# Patient Record
Sex: MALE | Race: WHITE
[De-identification: names, ages, dates, MRNs, and addresses within clinical notes are randomized per-mention and may not be internally consistent; named-entity substitution may affect disease eponyms.]

---

## 2023-07-08 ENCOUNTER — HOSPITAL ENCOUNTER (OUTPATIENT)
Dept: HOSPITAL 101 - LAB | Age: 63
Discharge: HOME | End: 2023-07-08
Payer: MEDICARE

## 2023-07-08 DIAGNOSIS — R19.7: Primary | ICD-10-CM

## 2023-09-06 PROBLEM — Z98.61 S/P PTCA (PERCUTANEOUS TRANSLUMINAL CORONARY ANGIOPLASTY): Status: ACTIVE | Noted: 2023-09-06

## 2023-09-06 PROBLEM — I25.10 2-VESSEL CORONARY ARTERY DISEASE: Status: ACTIVE | Noted: 2023-09-06

## 2023-09-06 PROBLEM — I25.2 HISTORY OF ANTERIOR WALL MYOCARDIAL INFARCTION: Status: ACTIVE | Noted: 2023-09-06

## 2023-09-06 PROBLEM — I50.22 CHRONIC SYSTOLIC CONGESTIVE HEART FAILURE (MULTI): Status: ACTIVE | Noted: 2023-09-06

## 2023-09-06 PROBLEM — I25.5 ISCHEMIC CARDIOMYOPATHY: Status: ACTIVE | Noted: 2023-09-06

## 2023-09-06 PROBLEM — I47.20 VENTRICULAR TACHYCARDIA (PAROXYSMAL): Status: ACTIVE | Noted: 2023-09-06

## 2023-09-06 PROBLEM — I10 HYPERTENSION: Status: ACTIVE | Noted: 2023-09-06

## 2023-09-06 PROBLEM — F17.200 CURRENT EVERY DAY SMOKER: Status: ACTIVE | Noted: 2023-09-06

## 2023-09-06 PROBLEM — R06.00 DYSPNEA: Status: ACTIVE | Noted: 2023-09-06

## 2023-09-06 PROBLEM — Z95.810 CARDIAC DEFIBRILLATOR IN PLACE: Status: ACTIVE | Noted: 2023-09-06

## 2023-09-06 PROBLEM — K02.9 DENTAL DECAY: Status: ACTIVE | Noted: 2023-09-06

## 2023-09-06 PROBLEM — I47.29 VENTRICULAR TACHYCARDIA (PAROXYSMAL) (MULTI): Status: ACTIVE | Noted: 2023-09-06

## 2023-09-06 RX ORDER — TRAZODONE HYDROCHLORIDE 100 MG/1
1 TABLET ORAL NIGHTLY
COMMUNITY

## 2023-09-06 RX ORDER — NITROGLYCERIN 0.4 MG/1
TABLET SUBLINGUAL SEE ADMIN INSTRUCTIONS
COMMUNITY

## 2023-09-06 RX ORDER — OMEPRAZOLE 20 MG/1
1 TABLET, DELAYED RELEASE ORAL DAILY
COMMUNITY

## 2023-09-06 RX ORDER — ASPIRIN 81 MG/1
1 TABLET ORAL DAILY
COMMUNITY

## 2023-09-06 RX ORDER — BUSPIRONE HYDROCHLORIDE 30 MG/1
1 TABLET ORAL 2 TIMES DAILY
COMMUNITY

## 2023-09-06 RX ORDER — GABAPENTIN 300 MG/1
1 CAPSULE ORAL 3 TIMES DAILY
COMMUNITY

## 2023-09-06 RX ORDER — SACUBITRIL AND VALSARTAN 24; 26 MG/1; MG/1
1 TABLET, FILM COATED ORAL 2 TIMES DAILY
COMMUNITY
Start: 2022-07-07

## 2023-09-06 RX ORDER — CARVEDILOL 6.25 MG/1
1 TABLET ORAL
COMMUNITY
End: 2024-04-29

## 2023-09-06 RX ORDER — VENLAFAXINE HYDROCHLORIDE 75 MG/1
1 CAPSULE, EXTENDED RELEASE ORAL DAILY
COMMUNITY

## 2023-09-06 RX ORDER — VENLAFAXINE HYDROCHLORIDE 150 MG/1
1 CAPSULE, EXTENDED RELEASE ORAL DAILY
COMMUNITY

## 2023-09-06 RX ORDER — ALBUTEROL SULFATE 90 UG/1
1 AEROSOL, METERED RESPIRATORY (INHALATION) EVERY 4 HOURS PRN
COMMUNITY

## 2023-09-06 RX ORDER — QUETIAPINE FUMARATE 50 MG/1
1 TABLET, FILM COATED ORAL NIGHTLY
COMMUNITY

## 2023-10-11 ENCOUNTER — OFFICE VISIT (OUTPATIENT)
Dept: CARDIOLOGY | Facility: CLINIC | Age: 63
End: 2023-10-11
Payer: MEDICARE

## 2023-10-11 ENCOUNTER — HOSPITAL ENCOUNTER (OUTPATIENT)
Dept: CARDIOLOGY | Facility: HOSPITAL | Age: 63
Discharge: HOME | End: 2023-10-11
Payer: MEDICARE

## 2023-10-11 VITALS
WEIGHT: 146 LBS | DIASTOLIC BLOOD PRESSURE: 78 MMHG | BODY MASS INDEX: 20.9 KG/M2 | HEIGHT: 70 IN | SYSTOLIC BLOOD PRESSURE: 118 MMHG

## 2023-10-11 DIAGNOSIS — Z95.810 CARDIAC DEFIBRILLATOR IN PLACE: ICD-10-CM

## 2023-10-11 DIAGNOSIS — F17.200 CURRENT EVERY DAY SMOKER: ICD-10-CM

## 2023-10-11 DIAGNOSIS — I25.5 ISCHEMIC CARDIOMYOPATHY: ICD-10-CM

## 2023-10-11 DIAGNOSIS — Z95.810 ICD (IMPLANTABLE CARDIOVERTER-DEFIBRILLATOR) IN PLACE: ICD-10-CM

## 2023-10-11 DIAGNOSIS — I47.29 VENTRICULAR TACHYCARDIA (PAROXYSMAL) (MULTI): Primary | ICD-10-CM

## 2023-10-11 DIAGNOSIS — I50.22 CHRONIC SYSTOLIC CONGESTIVE HEART FAILURE (MULTI): ICD-10-CM

## 2023-10-11 DIAGNOSIS — I42.9 PRIMARY CARDIOMYOPATHY (MULTI): ICD-10-CM

## 2023-10-11 PROCEDURE — 93290 INTERROG DEV EVAL ICPMS IP: CPT | Performed by: INTERNAL MEDICINE

## 2023-10-11 PROCEDURE — 3074F SYST BP LT 130 MM HG: CPT | Performed by: INTERNAL MEDICINE

## 2023-10-11 PROCEDURE — 3078F DIAST BP <80 MM HG: CPT | Performed by: INTERNAL MEDICINE

## 2023-10-11 PROCEDURE — 3008F BODY MASS INDEX DOCD: CPT | Performed by: INTERNAL MEDICINE

## 2023-10-11 PROCEDURE — 93000 ELECTROCARDIOGRAM COMPLETE: CPT | Performed by: INTERNAL MEDICINE

## 2023-10-11 PROCEDURE — 93283 PRGRMG EVAL IMPLANTABLE DFB: CPT | Performed by: INTERNAL MEDICINE

## 2023-10-11 PROCEDURE — 93290 INTERROG DEV EVAL ICPMS IP: CPT

## 2023-10-11 PROCEDURE — 99214 OFFICE O/P EST MOD 30 MIN: CPT | Performed by: INTERNAL MEDICINE

## 2023-10-11 RX ORDER — AMOXICILLIN 500 MG/1
500 CAPSULE ORAL
COMMUNITY
Start: 2023-09-29 | End: 2024-05-13 | Stop reason: WASHOUT

## 2023-10-11 RX ORDER — ATORVASTATIN CALCIUM 20 MG/1
20 TABLET, FILM COATED ORAL NIGHTLY
COMMUNITY

## 2023-10-11 RX ORDER — MEMANTINE HYDROCHLORIDE 28 MG/1
28 CAPSULE, EXTENDED RELEASE ORAL DAILY
COMMUNITY
Start: 2023-09-26

## 2023-10-11 NOTE — PROGRESS NOTES
CARDIOLOGY OFFICE VISIT      CHIEF COMPLAINT  Chief Complaint   Patient presents with    Follow-up     Patient presented today for a 6 month follow up.         HISTORY OF PRESENT ILLNESS  HPI      63-year-old  male who is followed for ischemic cardiomyopathy with a left ventricular ejection fraction of 34% per cardiac MRI dated August 30, 2021 with Lexiscan stress test dated June 30, 2022 revealing an ejection fraction of 26% with severe large anterior septal, apical, and lateral MI. Patient underwent PTCA with drug-eluting stent to the mid LAD on May 20, 2018. 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 He underwent implantation of a dual-chamber ICD on September 10, 2021 for primary prevention of sudden cardiac death and presents to the office today for follow-up evaluation.    From the electrophysiology standpoint he is doing well.  Patient states that he denies any symptoms of chest pain or shortness of breath or palpitations.    Patient had an ICD interrogation today at the device clinic and it shows adequate sensing, capture and impedances of all leads.  No evidence of atrial fibrillation.  Battery longevity 10 years.    EKG performed today shows sinus rhythm at rate of 67 bpm anteroseptal infarct.  QRS duration 96 ms.  QT corrected 456 ms.  Rhythm strip shows the same pattern.        Past Medical History  Past Medical History:   Diagnosis Date    Personal history of other specified conditions 11/28/2021    History of weight loss       Social History  Social History     Tobacco Use    Smoking status: Not on file    Smokeless tobacco: Not on file   Substance Use Topics    Alcohol use: Not on file    Drug use: Not on file       Family History     Family History   Problem Relation Name Age of Onset    Other (Cardiac pacemaker) Mother          Allergies:  No Known Allergies     Outpatient Medications:  Current Outpatient Medications   Medication  Instructions    albuterol 90 mcg/actuation inhaler 1 puff, inhalation, Every 4 hours PRN    amoxicillin (AMOXIL) 500 mg, oral, 3 times daily RT    aspirin 81 mg EC tablet 1 tablet, oral, Daily    atorvastatin (LIPITOR) 20 mg, oral, Nightly    busPIRone (Buspar) 30 mg tablet 1 tablet, oral, 2 times daily    carvedilol (Coreg) 6.25 mg tablet 1 tablet, oral, 2 times daily with meals    gabapentin (Neurontin) 300 mg capsule 1 capsule, oral, 3 times daily    L. acidophilus/Bifid. animalis 32 billion cell capsule oral, Use as directed    memantine (NAMENDA) 28 mg, oral, Daily    nitroglycerin (Nitrostat) 0.4 mg SL tablet sublingual, See admin instructions, PLACE 1 TABLET UNDER THE TONGUE EVERY 5 MINUTES FOR UP TO 3 DOSES AS NEEDED FOR CHEST PAIN.CALL 911 IF PAIN PERSISTS.    omeprazole OTC (PriLOSEC OTC) 20 mg EC tablet 1 tablet, oral, Daily    QUEtiapine (SEROquel) 50 mg tablet 1 tablet, oral, Nightly    sacubitriL-valsartan (Entresto) 24-26 mg tablet 1 tablet, oral, 2 times daily    traZODone (Desyrel) 100 mg tablet 1 tablet, oral, Nightly    venlafaxine XR (Effexor-XR) 150 mg 24 hr capsule 1 capsule, oral, Daily, Take with food    venlafaxine XR (Effexor-XR) 75 mg 24 hr capsule 1 capsule, oral, Daily, Take with food          REVIEW OF SYSTEMS  ROS      VITALS  Vitals:    10/11/23 1447   BP: 118/78       PHYSICAL EXAM  Physical Exam      ASSESSMENT AND PLAN    Clinical impressions:  1. Ischemic cardiomyopathy with a left ventricular ejection fraction of 34% per cardiac MRI dated August 30, 2021, New York Heart Association class II, stage B heart failure.  2. Coronary artery disease status post angioplasty with stent deployment to the mid LAD on May 28, 2018 with cardiac MRI dated August 30, 2021 revealing a large transmural anterior, apical, and septal MI.  3. 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  4. Ascending thoracic aneurysm at 4.1 cm per cardiac MRI dated August 30, 2021.  5. Hypertension,  controlled   6. Tobacco dependency.  7. Nonsustained ventricular tachycardia controlled on beta-blockade.  8. COPD on albuterol as needed.  9. Gastroesophageal reflux disease.  10. Anxiety disorder.  11.  Status post dual-chamber ICD implanted in September 2021 for primary prevention of sudden cardiac death      Plan-recommendations    From the electrophysiology standpoint he is doing well.  Patient will continue with current medical therapy.    Continue with beta-blocker therapy.    We will continue watching the burden of atrial and ventricular arrhythmias by device interrogation.    Followed by device clinic as scheduled.    Follow my office every 6 months or sooner needed.    Risk factor modification and lifestyle modification discussed with patient. Diet , exercise and hydration discussed with patient.    I have personally review with patient during this office visit, laboratory data, echocardiogram results, stress test results, Holter-event monitor results prior and after the last electrophysiology visit. All questions has been answered.    Please excuse any errors in grammar or translation related to this dictation.  Voice recognition software was utilized to prepare this document.

## 2023-11-03 ENCOUNTER — TELEPHONE (OUTPATIENT)
Dept: CARDIOLOGY | Facility: CLINIC | Age: 63
End: 2023-11-03
Payer: MEDICARE

## 2023-11-03 NOTE — TELEPHONE ENCOUNTER
Patient's wife called office stating he just received a new remote monitor 2 weeks ago from [employer].  It was working fine and now the light is blinking orange.  She was wondering if we knew why.  I advised her to contact [employer].  She believes that her  most likely threw out the book for the monitor.  She will contact [employer].

## 2023-11-10 ENCOUNTER — HOSPITAL ENCOUNTER (OUTPATIENT)
Dept: CARDIOLOGY | Facility: HOSPITAL | Age: 63
Discharge: HOME | End: 2023-11-10
Payer: MEDICARE

## 2023-11-10 DIAGNOSIS — Z95.810 CARDIAC DEFIBRILLATOR IN PLACE: ICD-10-CM

## 2024-01-17 ENCOUNTER — HOSPITAL ENCOUNTER (OUTPATIENT)
Dept: CARDIOLOGY | Facility: HOSPITAL | Age: 64
Discharge: HOME | End: 2024-01-17
Payer: MEDICARE

## 2024-01-17 ENCOUNTER — HOSPITAL ENCOUNTER
Dept: HOSPITAL 101 - LAB | Age: 64
Discharge: HOME | End: 2024-01-17
Payer: MEDICARE

## 2024-01-17 DIAGNOSIS — Z95.810 CARDIAC DEFIBRILLATOR IN PLACE: ICD-10-CM

## 2024-01-17 DIAGNOSIS — I50.42: ICD-10-CM

## 2024-01-17 DIAGNOSIS — R23.8: Primary | ICD-10-CM

## 2024-01-17 LAB
ADD MANUAL DIFF: NO
HCT VFR BLD CALC: 35.6 % (ref 42–54)
HEMATOCRIT: 35.6 % (ref 42–54)
HEMOGLOBIN: 11.9 G/DL (ref 14–18)
IMMATURE GRANULOCYTES ABS AUTO: 0.01 10^3/UL (ref 0–0.03)
IMMATURE GRANULOCYTES PCT AUTO: 0.2 % (ref 0–0.5)
INR: <0.93
LYMPHOCYTES  ABSOLUTE AUTO: 1.5 10^3/UL (ref 1.2–3.8)
MCV RBC: 100 FL (ref 80–94)
MEAN CORPUSCULAR HEMOGLOBIN: 33.4 PG (ref 25.9–34)
MEAN CORPUSCULAR HGB CONC: 33.4 G/DL (ref 29.9–35.2)
MEAN CORPUSCULAR VOLUME: 100 FL (ref 80–94)
PARTIAL THROMBOPLASTIN TIME: 25.8 SEC (ref 22.3–36.2)
PLATELET # BLD: 225 10^3/UL (ref 150–450)
PLATELET COUNT: 225 10^3/UL (ref 150–450)
PROTHROMBIN TIME: 9.8 SEC (ref 9–11.6)
RED BLOOD COUNT: 3.56 10^6/UL (ref 4.7–6.1)
WBC # BLD: 5.3 10^3/UL (ref 4–11)
WHITE BLOOD COUNT: 5.3 10^3/UL (ref 4–11)

## 2024-01-17 PROCEDURE — 36415 COLL VENOUS BLD VENIPUNCTURE: CPT

## 2024-01-17 PROCEDURE — 85610 PROTHROMBIN TIME: CPT

## 2024-01-17 PROCEDURE — 93296 REM INTERROG EVL PM/IDS: CPT

## 2024-01-17 PROCEDURE — 85730 THROMBOPLASTIN TIME PARTIAL: CPT

## 2024-01-17 PROCEDURE — 93295 DEV INTERROG REMOTE 1/2/MLT: CPT | Performed by: INTERNAL MEDICINE

## 2024-01-17 PROCEDURE — 85025 COMPLETE CBC W/AUTO DIFF WBC: CPT

## 2024-03-20 ENCOUNTER — HOSPITAL ENCOUNTER (OUTPATIENT)
Dept: CARDIOLOGY | Facility: HOSPITAL | Age: 64
Discharge: HOME | End: 2024-03-20
Payer: MEDICARE

## 2024-03-20 DIAGNOSIS — Z95.810 CARDIAC DEFIBRILLATOR IN PLACE: ICD-10-CM

## 2024-04-16 ENCOUNTER — HOSPITAL ENCOUNTER (OUTPATIENT)
Dept: CARDIOLOGY | Facility: HOSPITAL | Age: 64
Discharge: HOME | End: 2024-04-16
Payer: MEDICARE

## 2024-04-16 DIAGNOSIS — Z95.810 PRESENCE OF AUTOMATIC CARDIOVERTER/DEFIBRILLATOR (AICD): ICD-10-CM

## 2024-04-16 DIAGNOSIS — Z95.810 PRESENCE OF AUTOMATIC CARDIOVERTER/DEFIBRILLATOR (AICD): Primary | ICD-10-CM

## 2024-04-16 DIAGNOSIS — I42.9 CARDIOMYOPATHY, UNSPECIFIED TYPE (MULTI): ICD-10-CM

## 2024-04-16 PROCEDURE — 93295 DEV INTERROG REMOTE 1/2/MLT: CPT | Performed by: INTERNAL MEDICINE

## 2024-04-16 PROCEDURE — 93296 REM INTERROG EVL PM/IDS: CPT

## 2024-04-17 ENCOUNTER — APPOINTMENT (OUTPATIENT)
Dept: CARDIOLOGY | Facility: HOSPITAL | Age: 64
End: 2024-04-17
Payer: MEDICARE

## 2024-04-17 ENCOUNTER — APPOINTMENT (OUTPATIENT)
Dept: CARDIOLOGY | Facility: CLINIC | Age: 64
End: 2024-04-17
Payer: MEDICARE

## 2024-04-29 DIAGNOSIS — I10 HYPERTENSION, UNSPECIFIED TYPE: ICD-10-CM

## 2024-04-29 RX ORDER — CARVEDILOL 6.25 MG/1
6.25 TABLET ORAL
Qty: 180 TABLET | Refills: 3 | Status: SHIPPED | OUTPATIENT
Start: 2024-04-29 | End: 2024-05-13

## 2024-05-13 ENCOUNTER — HOSPITAL ENCOUNTER (OUTPATIENT)
Dept: CARDIOLOGY | Facility: HOSPITAL | Age: 64
Discharge: HOME | End: 2024-05-13
Payer: MEDICARE

## 2024-05-13 ENCOUNTER — OFFICE VISIT (OUTPATIENT)
Dept: CARDIOLOGY | Facility: CLINIC | Age: 64
End: 2024-05-13
Payer: MEDICARE

## 2024-05-13 VITALS
HEART RATE: 62 BPM | DIASTOLIC BLOOD PRESSURE: 60 MMHG | SYSTOLIC BLOOD PRESSURE: 87 MMHG | HEIGHT: 70 IN | WEIGHT: 151 LBS | BODY MASS INDEX: 21.62 KG/M2

## 2024-05-13 DIAGNOSIS — Z95.810 CARDIAC DEFIBRILLATOR IN PLACE: Primary | ICD-10-CM

## 2024-05-13 DIAGNOSIS — I47.29 VENTRICULAR TACHYCARDIA (PAROXYSMAL) (MULTI): ICD-10-CM

## 2024-05-13 DIAGNOSIS — I10 PRIMARY HYPERTENSION: ICD-10-CM

## 2024-05-13 DIAGNOSIS — I10 HYPERTENSION, UNSPECIFIED TYPE: ICD-10-CM

## 2024-05-13 DIAGNOSIS — Z95.810 PRESENCE OF AUTOMATIC CARDIOVERTER/DEFIBRILLATOR (AICD): ICD-10-CM

## 2024-05-13 DIAGNOSIS — I25.10 2-VESSEL CORONARY ARTERY DISEASE: ICD-10-CM

## 2024-05-13 DIAGNOSIS — F17.200 CURRENT EVERY DAY SMOKER: ICD-10-CM

## 2024-05-13 DIAGNOSIS — I42.9 CARDIOMYOPATHY, UNSPECIFIED TYPE (MULTI): ICD-10-CM

## 2024-05-13 DIAGNOSIS — I50.22 CHRONIC SYSTOLIC CONGESTIVE HEART FAILURE (MULTI): ICD-10-CM

## 2024-05-13 DIAGNOSIS — I25.5 ISCHEMIC CARDIOMYOPATHY: ICD-10-CM

## 2024-05-13 DIAGNOSIS — Z98.61 S/P PTCA (PERCUTANEOUS TRANSLUMINAL CORONARY ANGIOPLASTY): ICD-10-CM

## 2024-05-13 DIAGNOSIS — R06.09 DYSPNEA ON EXERTION: ICD-10-CM

## 2024-05-13 PROCEDURE — 3008F BODY MASS INDEX DOCD: CPT | Performed by: NURSE PRACTITIONER

## 2024-05-13 PROCEDURE — 93283 PRGRMG EVAL IMPLANTABLE DFB: CPT | Performed by: INTERNAL MEDICINE

## 2024-05-13 PROCEDURE — 3074F SYST BP LT 130 MM HG: CPT | Performed by: NURSE PRACTITIONER

## 2024-05-13 PROCEDURE — 3078F DIAST BP <80 MM HG: CPT | Performed by: NURSE PRACTITIONER

## 2024-05-13 PROCEDURE — 93283 PRGRMG EVAL IMPLANTABLE DFB: CPT

## 2024-05-13 PROCEDURE — 99214 OFFICE O/P EST MOD 30 MIN: CPT | Performed by: NURSE PRACTITIONER

## 2024-05-13 RX ORDER — CARVEDILOL 6.25 MG/1
3.12 TABLET ORAL
Qty: 180 TABLET | Refills: 3 | Status: SHIPPED | OUTPATIENT
Start: 2024-05-13

## 2024-05-13 NOTE — PROGRESS NOTES
"CARDIOLOGY OFFICE VISIT      CHIEF COMPLAINT  Chief Complaint   Patient presents with    Follow-up     Pt is here today following up after 6 months w/ PMC   Chief complaint: \"My blood pressures have been low.\"    HISTORY OF PRESENT ILLNESS  HPI  History: The patient is a 64-year-old  male who is followed for ischemic cardiomyopathy with a left ventricular ejection fraction of 34% per cardiac MRI dated August 30, 2021 and Lexiscan stress test dated June 30, 2022 revealing an ejection fraction of 26% with a severe large anterior septal, apical, and lateral MI.  He underwent a PTCA with drug-eluting stent to the mid LAD on May 20, 2018.  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  He underwent implantation of a dual-chamber ICD on September 10, 2021 for primary prevention of sudden cardiac death and presents the office today for follow-up evaluation.  He states that his blood pressure has been running low and he is experiencing some transient dizziness and lightheadedness.  He denies near or lesly syncope, chest pain, palpitations, shortness of breath, abdominal distention, or lower extremity edema.  He is accompanied by his wife for today's office visit.  Past Medical History  Past Medical History:   Diagnosis Date    Personal history of other specified conditions 11/28/2021    History of weight loss       Social History  Social History     Tobacco Use    Smoking status: Not on file    Smokeless tobacco: Not on file   Substance Use Topics    Alcohol use: Not on file    Drug use: Not on file       Family History     Family History   Problem Relation Name Age of Onset    Other (Cardiac pacemaker) Mother          Allergies:  No Known Allergies     Outpatient Medications:  Current Outpatient Medications   Medication Instructions    albuterol 90 mcg/actuation inhaler 1 puff, inhalation, Every 4 hours PRN    amoxicillin (AMOXIL) 500 mg, oral, 3 times " daily RT    aspirin 81 mg EC tablet 1 tablet, oral, Daily    atorvastatin (LIPITOR) 20 mg, oral, Nightly    busPIRone (Buspar) 30 mg tablet 1 tablet, oral, 2 times daily    carvedilol (COREG) 6.25 mg, oral, 2 times daily with meals    gabapentin (Neurontin) 300 mg capsule 1 capsule, oral, 3 times daily    L. acidophilus/Bifid. animalis 32 billion cell capsule oral, Use as directed    memantine (NAMENDA) 28 mg, oral, Daily    nitroglycerin (Nitrostat) 0.4 mg SL tablet sublingual, See admin instructions, PLACE 1 TABLET UNDER THE TONGUE EVERY 5 MINUTES FOR UP TO 3 DOSES AS NEEDED FOR CHEST PAIN.CALL 911 IF PAIN PERSISTS.    omeprazole OTC (PriLOSEC OTC) 20 mg EC tablet 1 tablet, oral, Daily    QUEtiapine (SEROquel) 50 mg tablet 1 tablet, oral, Nightly    sacubitriL-valsartan (Entresto) 24-26 mg tablet 1 tablet, oral, 2 times daily    traZODone (Desyrel) 100 mg tablet 1 tablet, oral, Nightly    venlafaxine XR (Effexor-XR) 150 mg 24 hr capsule 1 capsule, oral, Daily, Take with food    venlafaxine XR (Effexor-XR) 75 mg 24 hr capsule 1 capsule, oral, Daily, Take with food          REVIEW OF SYSTEMS  Review of Systems   All other systems reviewed and are negative.        VITALS  There were no vitals filed for this visit.    PHYSICAL EXAM  Vitals and nursing note reviewed.   Constitutional:       Appearance: Normal appearance.   HENT:      Head: Normocephalic.   Neck:      Vascular: No JVD.   Cardiovascular:      Rate and Rhythm: Normal rate and regular rhythm.      Pulses: Normal pulses.      Heart sounds: Normal heart sounds.   Pulmonary:      Effort: Pulmonary effort is normal.      Breath sounds: Normal breath sounds.   Abdominal:      General: Bowel sounds are normal.      Palpations: Abdomen is soft.   Musculoskeletal:         General: Normal range of motion.      Cervical back: Normal range of motion.   Skin:     General: Skin is warm and dry.  Left subclavian ICD pocket is well-healed without redness swelling or  drainage.  Neurological:      General: No focal deficit present.      Mental Status: She is alert and oriented to person, place, and time.      Motor: Motor function is intact.   Psychiatric:         Attention and Perception: Attention and perception normal.         Mood and Affect: Mood and affect normal.         Speech: Speech normal.         Behavior: Behavior normal. Behavior is cooperative.         Thought Content: Thought content normal.         Cognition and Memory: Cognition and memory normal.        Labs and testing: Twelve-lead EKG reveals atrial pacing and ventricular tracking at 62 bpm, prolonged AV conduction with a CO interval of 224 ms, QRS durations 84 ms,  ms, QTc 416 ms.  No acute ischemic changes are noted.  ICD interrogation dated May 13, 2024 reveals atrial pacing 20.77% and ventricular pacing 0.05%.  No atrial or ventricular arrhythmic events are noted.  Good sensing and capture thresholds are noted.  Estimated battery longevity is 10 years and 1 month.    ASSESSMENT AND PLAN    Clinical impressions:  1. Ischemic cardiomyopathy with a left ventricular ejection fraction of 34% per cardiac MRI dated August 30, 2021, New York Heart Association class II, stage B heart failure.  2. Coronary artery disease status post angioplasty with stent deployment to the mid LAD on May 28, 2018 with cardiac MRI dated August 30, 2021 revealing a large transmural anterior, apical, and septal MI.  3. 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  4. Ascending thoracic aneurysm at 4.1 cm per cardiac MRI dated August 30, 2021.  5. Hypertension, controlled with a blood pressure today of 87/60.  6. Tobacco dependency.  7. Nonsustained ventricular tachycardia controlled on beta-blockade.  8. COPD on albuterol as needed.  9. Gastroesophageal reflux disease.  10. Anxiety disorder.  11.  Dual-chamber ICD implant ([employer] cobalt XT DR MRI) on September 10, 2021 for primary prevention of sudden cardiac  death.    Recommendations:  1.  Continue current medications as prescribed.  The patient will reduce the dosage of carvedilol from 6.25 mg twice a day to 3.125 mg twice a day due to low blood pressure readings with complaints of dizziness and lightheadedness.  2.  Obtain a remote device interrogation on August 15, 2024 and in clinic check in 6 months prior to the office visit with Dr. Feldman.  3.  Follow-up in office with Dr. Feldman in 6 months or sooner if needed.  4.  Continue lifestyle modifications as discussed.  Patient was instructed to refrain from consuming Mountain Dew.  He was encouraged to increase water consumption to 64 ounces per day and refrain from caffeine and alcohol consumption as well.    Evaluation and note by Melissa Mcgee CNP  **Please excuse any errors in grammar or translation related to this dictation.  Voice recognition software was utilized to prepare this document.**

## 2024-08-14 ENCOUNTER — TELEPHONE (OUTPATIENT)
Dept: CARDIOLOGY | Facility: CLINIC | Age: 64
End: 2024-08-14
Payer: MEDICARE

## 2024-08-14 NOTE — TELEPHONE ENCOUNTER
Phone call from patient's wife requesting [facility] stress test results. Spoke with Dr Villarreal. Per Dr Villarreal stress test did not reveal any new findings. No ischemia. EF remains low. Patient follows with Dr Feldman. Patient can be scheduled for follow-up visit with Dr Villarreal for general cardiology. Message per v/m to call office an discuss.

## 2024-08-14 NOTE — TELEPHONE ENCOUNTER
Spoke with patients wife who verbalized understanding of results and sent to scheduling and he has an OV with Dr. Ren Villarreal DO on 09/05/24.

## 2024-08-15 ENCOUNTER — HOSPITAL ENCOUNTER (OUTPATIENT)
Dept: CARDIOLOGY | Facility: HOSPITAL | Age: 64
Discharge: HOME | End: 2024-08-15
Payer: MEDICARE

## 2024-08-15 DIAGNOSIS — I42.9 CARDIOMYOPATHY, UNSPECIFIED TYPE (MULTI): ICD-10-CM

## 2024-08-15 DIAGNOSIS — Z95.810 PRESENCE OF AUTOMATIC CARDIOVERTER/DEFIBRILLATOR (AICD): ICD-10-CM

## 2024-08-15 PROCEDURE — 93296 REM INTERROG EVL PM/IDS: CPT

## 2024-08-15 PROCEDURE — 93295 DEV INTERROG REMOTE 1/2/MLT: CPT | Performed by: INTERNAL MEDICINE

## 2024-09-05 ENCOUNTER — APPOINTMENT (OUTPATIENT)
Dept: CARDIOLOGY | Facility: CLINIC | Age: 64
End: 2024-09-05
Payer: MEDICARE

## 2024-09-05 VITALS
DIASTOLIC BLOOD PRESSURE: 70 MMHG | SYSTOLIC BLOOD PRESSURE: 106 MMHG | HEIGHT: 70 IN | HEART RATE: 68 BPM | WEIGHT: 145 LBS | BODY MASS INDEX: 20.76 KG/M2

## 2024-09-05 DIAGNOSIS — I47.29 VENTRICULAR TACHYCARDIA (PAROXYSMAL) (MULTI): ICD-10-CM

## 2024-09-05 DIAGNOSIS — Z98.61 S/P PTCA (PERCUTANEOUS TRANSLUMINAL CORONARY ANGIOPLASTY): ICD-10-CM

## 2024-09-05 DIAGNOSIS — I25.10 2-VESSEL CORONARY ARTERY DISEASE: ICD-10-CM

## 2024-09-05 DIAGNOSIS — F17.200 CURRENT EVERY DAY SMOKER: ICD-10-CM

## 2024-09-05 DIAGNOSIS — I50.9 CONGESTIVE HEART FAILURE, NYHA CLASS 2, UNSPECIFIED CONGESTIVE HEART FAILURE TYPE (MULTI): ICD-10-CM

## 2024-09-05 DIAGNOSIS — Z95.810 CARDIAC DEFIBRILLATOR IN PLACE: ICD-10-CM

## 2024-09-05 DIAGNOSIS — I10 PRIMARY HYPERTENSION: ICD-10-CM

## 2024-09-05 DIAGNOSIS — I25.5 ISCHEMIC CARDIOMYOPATHY: ICD-10-CM

## 2024-09-05 DIAGNOSIS — Z78.9 ALCOHOL USE: ICD-10-CM

## 2024-09-05 DIAGNOSIS — I25.2 HISTORY OF ANTERIOR WALL MYOCARDIAL INFARCTION: ICD-10-CM

## 2024-09-05 PROBLEM — F10.90 ALCOHOL USE: Status: ACTIVE | Noted: 2024-09-05

## 2024-09-05 PROCEDURE — 99406 BEHAV CHNG SMOKING 3-10 MIN: CPT | Performed by: INTERNAL MEDICINE

## 2024-09-05 PROCEDURE — 3008F BODY MASS INDEX DOCD: CPT | Performed by: INTERNAL MEDICINE

## 2024-09-05 PROCEDURE — 4004F PT TOBACCO SCREEN RCVD TLK: CPT | Performed by: INTERNAL MEDICINE

## 2024-09-05 PROCEDURE — 99214 OFFICE O/P EST MOD 30 MIN: CPT | Performed by: INTERNAL MEDICINE

## 2024-09-05 PROCEDURE — 3074F SYST BP LT 130 MM HG: CPT | Performed by: INTERNAL MEDICINE

## 2024-09-05 PROCEDURE — 3078F DIAST BP <80 MM HG: CPT | Performed by: INTERNAL MEDICINE

## 2024-09-05 RX ORDER — DONEPEZIL HYDROCHLORIDE 10 MG/1
10 TABLET, FILM COATED ORAL NIGHTLY
COMMUNITY
Start: 2024-08-19

## 2024-09-05 NOTE — LETTER
2024     Celestino Owen MD  [address] [facility] [place] [address]    Patient: Ren Ceron   YOB: 1960   Date of Visit: 2024       Dear Dr. Celestino Owen MD:    Thank you for referring Ren Ceron to me for evaluation. Below are my notes for this consultation.  If you have questions, please do not hesitate to call me. I look forward to following your patient along with you.       Sincerely,     Ren Villarreal, DO      CC: No Recipients  ______________________________________________________________________________________    Subjective   Ren Ceron is a 64 y.o. male       Chief Complaint    Follow-up          64-year-old gentleman returns to see me for the first time, status post recent hospitalization for chest discomfort, ruled out for acute coronary event; Lexiscan stress imaging revealed large anteroapical scar which is known from the past and ejection fraction of 26% details which are read re-reviewed.    Cardiac history dates back to  with anterior MI treated by myself at that time with primary revascularization of the LAD diagonal subsequently, had another MI in  treated by Dr. Umer Orellana at [facility] (now ), and subsequent referral for AICD implant with Dr. Feldman and now follows with Dr. Feldman as well.  Notes and procedures from 2018 through  are reviewed, imaging procedures reviewed.    Patient has ongoing mild dementia, heart failure class II/C, remains on appropriate GDMT, recently instituted Aldactone by myself during his last hospitalization this past August.    Notably he does have mild dilation of the ascending aortic root historically.    Recommendations, continue current therapies, smoking cessation counseling for 5 to 10 minutes, follow-up in 6 months with nurse practitioner, obtain chemistry and BNP profiles         Review of Systems   Constitutional: Positive for malaise/fatigue.   All other  "systems reviewed and are negative.           Vitals:    09/05/24 1019   BP: 106/70   BP Location: Right arm   Patient Position: Sitting   Pulse: 68   Weight: 65.8 kg (145 lb)   Height: 1.778 m (5' 10\")        Objective   Physical Exam  Constitutional:       Appearance: Normal appearance.   HENT:      Nose: Nose normal.   Neck:      Vascular: No carotid bruit.   Cardiovascular:      Rate and Rhythm: Normal rate.      Pulses: Normal pulses.      Heart sounds: Normal heart sounds.   Pulmonary:      Effort: Pulmonary effort is normal.   Abdominal:      General: Bowel sounds are normal.      Palpations: Abdomen is soft.   Musculoskeletal:         General: Normal range of motion.      Cervical back: Normal range of motion.      Right lower leg: No edema.      Left lower leg: No edema.   Skin:     General: Skin is warm and dry.   Neurological:      General: No focal deficit present.      Mental Status: He is alert.   Psychiatric:         Mood and Affect: Mood normal.         Behavior: Behavior normal.         Thought Content: Thought content normal.         Judgment: Judgment normal.         Allergies  Patient has no known allergies.     Current Medications    Current Outpatient Medications:   •  albuterol 90 mcg/actuation inhaler, Inhale 1 puff every 4 hours if needed., Disp: , Rfl:   •  aspirin 81 mg EC tablet, Take 1 tablet (81 mg) by mouth once daily., Disp: , Rfl:   •  atorvastatin (Lipitor) 20 mg tablet, Take 1 tablet (20 mg) by mouth once daily at bedtime., Disp: , Rfl:   •  busPIRone (Buspar) 30 mg tablet, Take 1 tablet (30 mg) by mouth 2 times a day., Disp: , Rfl:   •  carvedilol (Coreg) 6.25 mg tablet, Take 0.5 tablets (3.125 mg) by mouth 2 times a day with meals., Disp: 180 tablet, Rfl: 3  •  donepezil (Aricept) 10 mg tablet, Take 1 tablet (10 mg) by mouth once daily at bedtime., Disp: , Rfl:   •  gabapentin (Neurontin) 300 mg capsule, Take 1 capsule (300 mg) by mouth 3 times a day., Disp: , Rfl:   •  L. " acidophilus/Bifid. animalis 32 billion cell capsule, Take by mouth. Use as directed, Disp: , Rfl:   •  memantine (Namenda) 28 mg capsule,sprinkle,ER 24hr, Take 1 capsule (28 mg) by mouth once daily., Disp: , Rfl:   •  nitroglycerin (Nitrostat) 0.4 mg SL tablet, Place under the tongue see administration instructions. PLACE 1 TABLET UNDER THE TONGUE EVERY 5 MINUTES FOR UP TO 3 DOSES AS NEEDED FOR CHEST PAIN.CALL 911 IF PAIN PERSISTS., Disp: , Rfl:   •  omeprazole OTC (PriLOSEC OTC) 20 mg EC tablet, Take 1 tablet (20 mg) by mouth once daily., Disp: , Rfl:   •  QUEtiapine (SEROquel) 50 mg tablet, Take 1 tablet (50 mg) by mouth once daily at bedtime., Disp: , Rfl:   •  sacubitriL-valsartan (Entresto) 24-26 mg tablet, Take 1 tablet by mouth 2 times a day., Disp: , Rfl:   •  traZODone (Desyrel) 100 mg tablet, Take 1 tablet (100 mg) by mouth once daily at bedtime., Disp: , Rfl:   •  venlafaxine XR (Effexor-XR) 150 mg 24 hr capsule, Take 1 capsule (150 mg) by mouth once daily. Take with food, Disp: , Rfl:   •  venlafaxine XR (Effexor-XR) 75 mg 24 hr capsule, Take 1 capsule (75 mg) by mouth once daily. Take with food, Disp: , Rfl:                      Assessment/Plan   1. 2-vessel coronary artery disease        2. Congestive heart failure, NYHA class 2, unspecified congestive heart failure type (Multi)        3. History of anterior wall myocardial infarction        4. Ventricular tachycardia (paroxysmal) (Multi)        5. Ischemic cardiomyopathy        6. Cardiac defibrillator in place        7. S/P PTCA (percutaneous transluminal coronary angioplasty)        8. Alcohol use        9. Primary hypertension        10. Current every day smoker        11. Body mass index (BMI) 20.0-20.9, adult                 Scribe Attestation  By signing my name below, IArlette RN   , Scribe   attest that this documentation has been prepared under the direction and in the presence of Ren Villarreal DO.     Provider Attestation - Scribe  documentation    All medical record entries made by the Scribe were at my direction and personally dictated by me. I have reviewed the chart and agree that the record accurately reflects my personal performance of the history, physical exam, discussion and plan.

## 2024-09-05 NOTE — PROGRESS NOTES
"Subjective   Ren Ceron is a 64 y.o. male       Chief Complaint    Follow-up          64-year-old gentleman returns to see me for the first time, status post recent hospitalization for chest discomfort, ruled out for acute coronary event; Lexiscan stress imaging revealed large anteroapical scar which is known from the past and ejection fraction of 26% details which are read re-reviewed.    Cardiac history dates back to 2018 with anterior MI treated by myself at that time with primary revascularization of the LAD diagonal subsequently, had another MI in  treated by Dr. Umer Orellana at [facility] (now ), and subsequent referral for AICD implant with Dr. Feldman and now follows with Dr. Feldman as well.  Notes and procedures from 2018 through  are reviewed, imaging procedures reviewed.    Patient has ongoing mild dementia, heart failure class II/C, remains on appropriate GDMT, recently instituted Aldactone by myself during his last hospitalization this past August.    Notably he does have mild dilation of the ascending aortic root historically.    Recommendations, continue current therapies, smoking cessation counseling for 5 to 10 minutes, follow-up in 6 months with nurse practitioner, obtain chemistry and BNP profiles         Review of Systems   Constitutional: Positive for malaise/fatigue.   All other systems reviewed and are negative.           Vitals:    24 1019   BP: 106/70   BP Location: Right arm   Patient Position: Sitting   Pulse: 68   Weight: 65.8 kg (145 lb)   Height: 1.778 m (5' 10\")        Objective   Physical Exam  Constitutional:       Appearance: Normal appearance.   HENT:      Nose: Nose normal.   Neck:      Vascular: No carotid bruit.   Cardiovascular:      Rate and Rhythm: Normal rate.      Pulses: Normal pulses.      Heart sounds: Normal heart sounds.   Pulmonary:      Effort: Pulmonary effort is normal.   Abdominal:      General: Bowel sounds are normal.      Palpations: " Abdomen is soft.   Musculoskeletal:         General: Normal range of motion.      Cervical back: Normal range of motion.      Right lower leg: No edema.      Left lower leg: No edema.   Skin:     General: Skin is warm and dry.   Neurological:      General: No focal deficit present.      Mental Status: He is alert.   Psychiatric:         Mood and Affect: Mood normal.         Behavior: Behavior normal.         Thought Content: Thought content normal.         Judgment: Judgment normal.         Allergies  Patient has no known allergies.     Current Medications    Current Outpatient Medications:     albuterol 90 mcg/actuation inhaler, Inhale 1 puff every 4 hours if needed., Disp: , Rfl:     aspirin 81 mg EC tablet, Take 1 tablet (81 mg) by mouth once daily., Disp: , Rfl:     atorvastatin (Lipitor) 20 mg tablet, Take 1 tablet (20 mg) by mouth once daily at bedtime., Disp: , Rfl:     busPIRone (Buspar) 30 mg tablet, Take 1 tablet (30 mg) by mouth 2 times a day., Disp: , Rfl:     carvedilol (Coreg) 6.25 mg tablet, Take 0.5 tablets (3.125 mg) by mouth 2 times a day with meals., Disp: 180 tablet, Rfl: 3    donepezil (Aricept) 10 mg tablet, Take 1 tablet (10 mg) by mouth once daily at bedtime., Disp: , Rfl:     gabapentin (Neurontin) 300 mg capsule, Take 1 capsule (300 mg) by mouth 3 times a day., Disp: , Rfl:     L. acidophilus/Bifid. animalis 32 billion cell capsule, Take by mouth. Use as directed, Disp: , Rfl:     memantine (Namenda) 28 mg capsule,sprinkle,ER 24hr, Take 1 capsule (28 mg) by mouth once daily., Disp: , Rfl:     nitroglycerin (Nitrostat) 0.4 mg SL tablet, Place under the tongue see administration instructions. PLACE 1 TABLET UNDER THE TONGUE EVERY 5 MINUTES FOR UP TO 3 DOSES AS NEEDED FOR CHEST PAIN.CALL 911 IF PAIN PERSISTS., Disp: , Rfl:     omeprazole OTC (PriLOSEC OTC) 20 mg EC tablet, Take 1 tablet (20 mg) by mouth once daily., Disp: , Rfl:     QUEtiapine (SEROquel) 50 mg tablet, Take 1 tablet (50 mg) by  mouth once daily at bedtime., Disp: , Rfl:     sacubitriL-valsartan (Entresto) 24-26 mg tablet, Take 1 tablet by mouth 2 times a day., Disp: , Rfl:     traZODone (Desyrel) 100 mg tablet, Take 1 tablet (100 mg) by mouth once daily at bedtime., Disp: , Rfl:     venlafaxine XR (Effexor-XR) 150 mg 24 hr capsule, Take 1 capsule (150 mg) by mouth once daily. Take with food, Disp: , Rfl:     venlafaxine XR (Effexor-XR) 75 mg 24 hr capsule, Take 1 capsule (75 mg) by mouth once daily. Take with food, Disp: , Rfl:                      Assessment/Plan   1. 2-vessel coronary artery disease        2. Congestive heart failure, NYHA class 2, unspecified congestive heart failure type (Multi)        3. History of anterior wall myocardial infarction        4. Ventricular tachycardia (paroxysmal) (Multi)        5. Ischemic cardiomyopathy        6. Cardiac defibrillator in place        7. S/P PTCA (percutaneous transluminal coronary angioplasty)        8. Alcohol use        9. Primary hypertension        10. Current every day smoker        11. Body mass index (BMI) 20.0-20.9, adult                 Scribe Attestation  By signing my name below, I, Arlette BROTHERS RN   , Scribe   attest that this documentation has been prepared under the direction and in the presence of Ren Villarreal DO.     Provider Attestation - Scribe documentation    All medical record entries made by the Scribe were at my direction and personally dictated by me. I have reviewed the chart and agree that the record accurately reflects my personal performance of the history, physical exam, discussion and plan.

## 2024-10-11 ENCOUNTER — HOSPITAL ENCOUNTER
Dept: HOSPITAL 101 - LAB | Age: 64
Discharge: HOME | End: 2024-10-11
Payer: MEDICARE

## 2024-10-11 DIAGNOSIS — Z12.12: ICD-10-CM

## 2024-10-11 DIAGNOSIS — Z12.5: ICD-10-CM

## 2024-10-11 DIAGNOSIS — I10: ICD-10-CM

## 2024-10-11 DIAGNOSIS — K21.9: ICD-10-CM

## 2024-10-11 DIAGNOSIS — R25.2: ICD-10-CM

## 2024-10-11 DIAGNOSIS — E03.9: ICD-10-CM

## 2024-10-11 DIAGNOSIS — R23.8: ICD-10-CM

## 2024-10-11 DIAGNOSIS — R41.3: Primary | ICD-10-CM

## 2024-10-11 LAB
ADD MANUAL DIFF: NO
ALANINE AMINOTRANSFERASE: 21 U/L (ref 16–63)
ALBUMIN GLOBULIN RATIO: 1.2
ALBUMIN LEVEL: 3.8 G/DL (ref 3.4–5)
ALKALINE PHOSPHATASE: 81 U/L (ref 46–116)
AMMONIA: <10 UMOL/L (ref 11–32)
ANION GAP: 12.9
ASPARTATE AMINO TRANSFERASE: 26 U/L (ref 15–37)
BLOOD UREA NITROGEN: 3 MG/DL (ref 7–18)
CALCIUM: 8.6 MG/DL (ref 8.5–10.1)
CARBON DIOXIDE: 26.1 MMOL/L (ref 21–32)
CAST SEEN?: (no result) #/LPF
CHLORIDE: 101 MMOL/L (ref 98–107)
CO2 BLD-SCNC: 26.1 MMOL/L (ref 21–32)
ESTIMATED GFR (AFRICAN AMERICA: >60
ESTIMATED GFR (NON-AFRICAN AME: >60
FOLATE: 10.8 NG/ML (ref 8.6–58.9)
GLOBULIN: 3.3 G/DL
GLUCOSE BLD-MCNC: 100 MG/DL (ref 74–106)
GLUCOSE URINE UA: NEGATIVE MG/DL
HCT VFR BLD CALC: 35.2 % (ref 42–54)
HEMATOCRIT: 35.2 % (ref 42–54)
HEMOGLOBIN: 11.9 G/DL (ref 14–18)
IMMATURE GRANULOCYTES ABS AUTO: 0.02 10^3/UL (ref 0–0.03)
IMMATURE GRANULOCYTES PCT AUTO: 0.2 % (ref 0–0.5)
INR: 0.96
LYMPHOCYTES  ABSOLUTE AUTO: 1.9 10^3/UL (ref 1.2–3.8)
MAGNESIUM: 1.9 MG/DL (ref 1.8–2.4)
MCV RBC: 100 FL (ref 80–94)
MEAN CORPUSCULAR HEMOGLOBIN: 33.8 PG (ref 25.9–34)
MEAN CORPUSCULAR HGB CONC: 33.8 G/DL (ref 29.9–35.2)
MEAN CORPUSCULAR VOLUME: 100 FL (ref 80–94)
PARTIAL THROMBOPLASTIN TIME: 25.6 SEC (ref 22.3–36.2)
PLATELET # BLD: 278 10^3/UL (ref 150–450)
PLATELET COUNT: 278 10^3/UL (ref 150–450)
POTASSIUM SERPLBLD-SCNC: 4 MMOL/L (ref 3.5–5.1)
POTASSIUM: 4 MMOL/L (ref 3.5–5.1)
PROTHROMBIN TIME: 10.2 SEC (ref 9–11.6)
RED BLOOD COUNT: 3.52 10^6/UL (ref 4.7–6.1)
SODIUM BLD-SCNC: 136 MMOL/L (ref 136–145)
SODIUM: 136 MMOL/L (ref 136–145)
THYROID STIMULATING HORMONE: 0.99 UIU/ML (ref 0.36–3.74)
TOTAL PROTEIN: 7.1 G/DL (ref 6.4–8.2)
WBC # BLD: 8.2 10^3/UL (ref 4–11)
WHITE BLOOD COUNT: 8.2 10^3/UL (ref 4–11)

## 2024-10-11 PROCEDURE — 36415 COLL VENOUS BLD VENIPUNCTURE: CPT

## 2024-10-11 PROCEDURE — 84153 ASSAY OF PSA TOTAL: CPT

## 2024-10-11 PROCEDURE — 82746 ASSAY OF FOLIC ACID SERUM: CPT

## 2024-10-11 PROCEDURE — 85730 THROMBOPLASTIN TIME PARTIAL: CPT

## 2024-10-11 PROCEDURE — 85025 COMPLETE CBC W/AUTO DIFF WBC: CPT

## 2024-10-11 PROCEDURE — 84100 ASSAY OF PHOSPHORUS: CPT

## 2024-10-11 PROCEDURE — 85610 PROTHROMBIN TIME: CPT

## 2024-10-11 PROCEDURE — 84443 ASSAY THYROID STIM HORMONE: CPT

## 2024-10-11 PROCEDURE — 81001 URINALYSIS AUTO W/SCOPE: CPT

## 2024-10-11 PROCEDURE — 80053 COMPREHEN METABOLIC PANEL: CPT

## 2024-10-11 PROCEDURE — 82140 ASSAY OF AMMONIA: CPT

## 2024-10-11 PROCEDURE — 87086 URINE CULTURE/COLONY COUNT: CPT

## 2024-10-11 PROCEDURE — 83735 ASSAY OF MAGNESIUM: CPT

## 2024-10-11 PROCEDURE — 82607 VITAMIN B-12: CPT

## 2024-10-11 PROCEDURE — 84154 ASSAY OF PSA FREE: CPT

## 2024-10-11 PROCEDURE — 84439 ASSAY OF FREE THYROXINE: CPT

## 2024-10-12 LAB
PSA, FREE: 0.23 NG/ML
VITAMIN B12: 228 PG/ML (ref 232–1245)

## 2024-10-28 ENCOUNTER — HOSPITAL ENCOUNTER
Age: 64
Discharge: HOME | End: 2024-10-28
Payer: MEDICARE

## 2024-10-28 DIAGNOSIS — R25.2: ICD-10-CM

## 2024-10-28 DIAGNOSIS — R09.89: Primary | ICD-10-CM

## 2024-10-28 DIAGNOSIS — M79.606: ICD-10-CM

## 2024-10-28 DIAGNOSIS — R60.0: ICD-10-CM

## 2024-10-28 PROCEDURE — 93923 UPR/LXTR ART STDY 3+ LVLS: CPT

## 2024-10-28 NOTE — VEIN_ITS
The [address] [address] 
     [phone] 
  
  
Patient Name: 
JEFFREY ABDI 
  
MRN: [id number]    YOB: 1960    Sex: M 
Assigned Patient Location:  
Current Patient Location:  
Accession/Order Number: [id number] 
Exam Date: 10/28/2024  14:36    Report Date: 10/28/2024  15:21 
  
At the request of: 
LEONARD BUTT   
  
Procedure:  VC SEGMENTAL PRESSURES 
  
EXAM: VC SEGMENTAL PRESSURES  
  
HISTORY: R09.89 
  
COMPARISON: None.  
  
FINDINGS:  
  
Segmental pressures presented as follows (right, left) in mmHg. 
  
Brachial: 99, 99 
Upper thigh: 133, 119 
Lower thigh: 133, 140 
Calf: 137, 153 
DPA: 108, 119 
PTA: 95, 108 
1st Toe: 75, 79 
  
IRVING: 1.09, 1.20 
TBI: 0.76, 0.80 
  
The ABIs are Normal 
The TBI's are Acceptable 
  
PVR waveforms: 
  
Right leg: 
Thigh: Normal 
Above knee: Normal 
Below knee: Normal 
Right ankle: Normal 
  
Left leg: 
Thigh: Normal 
Above knee: Normal 
Below knee: Normal 
Right ankle: Normal 
  
ORDER #: [id number] VEIN/VC SEGMENTAL PRESSURES  
IMPRESSION:   
   
Normal exam  
   
   
Electronically authenticated by: CAMI MACKENZIE   Date: 10/28/2024  15:21

## 2024-11-13 ENCOUNTER — APPOINTMENT (OUTPATIENT)
Dept: CARDIOLOGY | Facility: CLINIC | Age: 64
End: 2024-11-13
Payer: MEDICARE

## 2024-11-13 ENCOUNTER — HOSPITAL ENCOUNTER (OUTPATIENT)
Dept: CARDIOLOGY | Facility: HOSPITAL | Age: 64
Discharge: HOME | End: 2024-11-13
Payer: MEDICARE

## 2024-11-13 VITALS
HEART RATE: 61 BPM | WEIGHT: 149 LBS | DIASTOLIC BLOOD PRESSURE: 72 MMHG | BODY MASS INDEX: 21.33 KG/M2 | HEIGHT: 70 IN | SYSTOLIC BLOOD PRESSURE: 112 MMHG

## 2024-11-13 DIAGNOSIS — I47.29 VENTRICULAR TACHYCARDIA (PAROXYSMAL) (MULTI): ICD-10-CM

## 2024-11-13 DIAGNOSIS — F17.200 CURRENT EVERY DAY SMOKER: ICD-10-CM

## 2024-11-13 DIAGNOSIS — Z95.810 CARDIAC DEFIBRILLATOR IN PLACE: ICD-10-CM

## 2024-11-13 DIAGNOSIS — R94.31 ABNORMAL EKG: ICD-10-CM

## 2024-11-13 DIAGNOSIS — Z95.810 CARDIAC DEFIBRILLATOR IN PLACE: Primary | ICD-10-CM

## 2024-11-13 PROCEDURE — 3078F DIAST BP <80 MM HG: CPT | Performed by: INTERNAL MEDICINE

## 2024-11-13 PROCEDURE — 3074F SYST BP LT 130 MM HG: CPT | Performed by: INTERNAL MEDICINE

## 2024-11-13 PROCEDURE — 93283 PRGRMG EVAL IMPLANTABLE DFB: CPT | Performed by: INTERNAL MEDICINE

## 2024-11-13 PROCEDURE — 93283 PRGRMG EVAL IMPLANTABLE DFB: CPT

## 2024-11-13 PROCEDURE — 99214 OFFICE O/P EST MOD 30 MIN: CPT | Performed by: INTERNAL MEDICINE

## 2024-11-13 PROCEDURE — 3008F BODY MASS INDEX DOCD: CPT | Performed by: INTERNAL MEDICINE

## 2024-11-13 PROCEDURE — 4004F PT TOBACCO SCREEN RCVD TLK: CPT | Performed by: INTERNAL MEDICINE

## 2024-11-13 PROCEDURE — 93000 ELECTROCARDIOGRAM COMPLETE: CPT | Mod: DISTINCT PROCEDURAL SERVICE | Performed by: INTERNAL MEDICINE

## 2024-11-13 ASSESSMENT — ENCOUNTER SYMPTOMS
DYSPNEA ON EXERTION: 0
PALPITATIONS: 0

## 2024-11-13 NOTE — PATIENT INSTRUCTIONS
Continue same medications/treatment.  Patient educated on proper medication use.  Patient educated on risk factor modification.  Please bring any lab results from other providers/physicians to your next appointment.    Please bring all medicines, vitamins, and herbal supplements with you when you come to the office.    Prescriptions will not be filled unless you are compliant with your follow up appointments or have a follow up appointment scheduled as per instruction of your physician. Refills should be requested at the time of your visit.    Follow up with Dr. Meyers in 6 months with device check  Continue remote checks at 3 and 9 months    Susanna SANTANA RN, AM SCRIBING FOR, AND IN THE PRESENCE OF DR. JANET MEYERS MD

## 2024-11-13 NOTE — PROGRESS NOTES
CARDIOLOGY OFFICE VISIT      CHIEF COMPLAINT  Chief Complaint   Patient presents with    Follow-up     6 mos       HISTORY OF PRESENT ILLNESS  HPI  64-year-old  male who is followed for ischemic cardiomyopathy with a left ventricular ejection fraction of 34% per cardiac MRI dated August 30, 2021 and Lexiscan stress test dated June 30, 2022 revealing an ejection fraction of 26% with a severe large anterior septal, apical, and lateral MI. He underwent a PTCA with drug-eluting stent to the mid LAD on May 20, 2018. 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 He underwent implantation of a dual-chamber ICD on September 10, 2021 for primary prevention of sudden cardiac death and presents the office today for follow-up evaluation.     In August 2024, patient presented to [facility] complaining of chest discomfort.  EKG and cardiac enzymes were negative.  He underwent stress test that shows evidence of infarction cardiomyopathy with a left ventricular action fraction of 25% but no evidence of ischemia.  Chest pain has subsided.    EKG performed today shows atrial paced rhythm at a rate of 61 bpm QRS duration 86 ms QT corrected 420 ms.  Rhythm strip shows the same pattern.    Patient had a device interrogation today at the device clinic and it shows dual-chamber ICD Medtronic with battery Ingevity 9 years 5 months.  No evidence of atrial or ventricular arrhythmias.      Past Medical History  Past Medical History:   Diagnosis Date    Personal history of other specified conditions 11/28/2021    History of weight loss       Social History  Social History     Tobacco Use    Smoking status: Every Day     Current packs/day: 0.50     Types: Cigarettes    Smokeless tobacco: Never   Substance Use Topics    Alcohol use: Yes     Comment: occasionally    Drug use: Never       Family History     Family History   Problem Relation Name Age of  Onset    Other (Cardiac pacemaker) Mother          Allergies:  No Known Allergies     Outpatient Medications:  Current Outpatient Medications   Medication Instructions    albuterol 90 mcg/actuation inhaler 1 puff, Every 4 hours PRN    aspirin 81 mg EC tablet 1 tablet, Daily    atorvastatin (LIPITOR) 20 mg, Nightly    busPIRone (Buspar) 30 mg tablet 1 tablet, 2 times daily    carvedilol (COREG) 3.125 mg, oral, 2 times daily (morning and late afternoon)    donepezil (ARICEPT) 10 mg, Nightly    gabapentin (Neurontin) 300 mg capsule 1 capsule, 3 times daily    L. acidophilus/Bifid. animalis 32 billion cell capsule Take by mouth. Use as directed    memantine (NAMENDA) 28 mg, Daily    nitroglycerin (Nitrostat) 0.4 mg SL tablet See admin instructions    omeprazole OTC (PriLOSEC OTC) 20 mg EC tablet 1 tablet, Daily    QUEtiapine (SEROquel) 50 mg tablet 1 tablet, Nightly    sacubitriL-valsartan (Entresto) 24-26 mg tablet 1 tablet, 2 times daily    traZODone (Desyrel) 100 mg tablet 1 tablet, Nightly    venlafaxine XR (Effexor-XR) 150 mg 24 hr capsule 1 capsule, Daily    venlafaxine XR (Effexor-XR) 75 mg 24 hr capsule 1 capsule, Daily          REVIEW OF SYSTEMS  Review of Systems   Cardiovascular:  Negative for chest pain, dyspnea on exertion and palpitations.   All other systems reviewed and are negative.        VITALS  Vitals:    11/13/24 1428   BP: 112/72   Pulse: 61       PHYSICAL EXAM  Constitutional:       General: Awake.      Appearance: Normal and healthy appearance. Well-developed and not in distress.   Neck:      Vascular: No JVR. JVD normal.   Pulmonary:      Effort: Pulmonary effort is normal.      Breath sounds: Normal breath sounds. No wheezing. No rhonchi. No rales.   Chest:      Chest wall: Not tender to palpatation.      Comments: Left sided device pocket- healed and well approximated. No swelling or hematoma      Cardiovascular:      PMI at left midclavicular line. Normal rate. Regular rhythm. Normal S1.  Normal S2.       Murmurs: There is no murmur.      No gallop.  No click. No rub.   Pulses:     Intact distal pulses.   Edema:     Peripheral edema absent.   Abdominal:      Tenderness: There is no abdominal tenderness.   Musculoskeletal: Normal range of motion.         General: No tenderness. Skin:     General: Skin is warm and dry.   Neurological:      General: No focal deficit present.      Mental Status: Alert and oriented to person, place and time.           ASSESSMENT AND PLAN       Clinical impressions:  1. Ischemic cardiomyopathy with a left ventricular ejection fraction of 34% per cardiac MRI dated August 30, 2021, [place] Heart Association class II, stage B heart failure.  2. Coronary artery disease status post angioplasty with stent deployment to the mid LAD on May 28, 2018 with cardiac MRI dated August 30, 2021 revealing a large transmural anterior, apical, and septal MI.  3. 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  4. Ascending thoracic aneurysm at 4.1 cm per cardiac MRI dated August 30, 2021.  5. Hypertension, controlled .  6. Tobacco dependency.  7. Nonsustained ventricular tachycardia controlled on beta-blockade.  8. COPD on albuterol as needed.  9. Gastroesophageal reflux disease.  10. Anxiety disorder.  11.  Dual-chamber ICD implant (Medtronic cobalt XT DR MRI) on September 10, 2021 for primary prevention of sudden cardiac death.    Plan,    From the electrophysiology standpoint he is doing well.  Will continue with current medical therapy.    Continue beta-blocker therapy.    Follow device clinic as scheduled    Follow my office every 6 months or sooner if needed.    So far no atrial ventricular rhythm is seen by device interrogation.    Risk factor modification and lifestyle modification discussed with patient. Diet , exercise and hydration discussed with patient.    I have personally review with patient during this office visit, laboratory data, echocardiogram results,  stress test results, Holter-event monitor results prior and after the last electrophysiology visit. All questions has been answered.    Please excuse any errors in grammar or translation related to this dictation.  Voice recognition software was utilized to prepare this document.

## 2024-12-18 ENCOUNTER — HOSPITAL ENCOUNTER
Dept: HOSPITAL 101 - LAB | Age: 64
Discharge: HOME | End: 2024-12-18
Payer: MEDICARE

## 2024-12-18 DIAGNOSIS — R10.31: ICD-10-CM

## 2024-12-18 DIAGNOSIS — F17.210: ICD-10-CM

## 2024-12-18 DIAGNOSIS — R19.7: Primary | ICD-10-CM

## 2024-12-18 LAB
ESTIMATED GFR (AFRICAN AMERICA: >60
ESTIMATED GFR (NON-AFRICAN AME: >60

## 2024-12-18 PROCEDURE — 74177 CT ABD & PELVIS W/CONTRAST: CPT

## 2024-12-18 PROCEDURE — 36415 COLL VENOUS BLD VENIPUNCTURE: CPT

## 2024-12-18 PROCEDURE — 71271 CT THORAX LUNG CANCER SCR C-: CPT

## 2024-12-18 PROCEDURE — 82565 ASSAY OF CREATININE: CPT

## 2024-12-18 NOTE — CT_ITS
[address] [address] 
     [phone] 
  
  
Patient Name: 
JEFFREY ABDI 
  
MRN: [id number]    YOB: 1960    Sex: M 
Assigned Patient Location: LAB 
Current Patient Location:   
Accession/Order Number: [id number] 
Exam Date: 12/18/2024  14:18    Report Date: 12/19/2024  05:33 
  
At the request of: 
LEONARD BUTT   
  
Procedure:  CT lung screening low-dose 
  
EXAMINATION: CT lung screening low-dose  
  
HISTORY: Emphysema J43.9 
  
COMPARISON: No relevant comparison available.  
  
TECHNIQUE: Axial, Coronal, and Sagittal images were created without the  
administration of IV contrast material. Dose reduction techniques were  
achieved  
by using automated exposure control and/or adjustment of mA and/or kV  
according  
to patient size and/or use of iterative reconstruction technique. 
  
FINDINGS: 
LUNGS: Numerous small peripheral blebs; fibrosis is felt less likely. No  
suspicious nodules or acute infiltrates. No significant emphysematous changes. 
PLEURA: No mass, effusion, or pneumothorax. 
VASCULATURE: No abnormality. 
ELIAS: No mass or pathologic adenopathy. 
MEDIASTINUM: No mass or pathologic adenopathy. 
CARDIAC:Cardiac pacer. No enlargement, pericardial thickening, or pericardial  
effusion. Coronary Artery calcifications: Coronary calcifications are heavy. 
AORTA: Dilated ascending aorta, 4.3 cm in diameter. 
CHEST WALL: No mass or axillary adenopathy 
BONES: No bone lesion or fracture. 
LIMITED ABDOMEN: No suspicious findings. Limited images of the upper abdomen. 
OTHER: Negative. 
  
ORDER #: [id number] CT/CT lung screening low-dose  
IMPRESSION:   
   
1. Lung-RADS Category 1 Negative. No nodules and definitely benign nodules.   
Continue annual screening with LDCT in 12 months.   
2. Mild aneurysmal dilation of the ascending thoracic aorta, 4.3 cm.  
   
   
Electronically authenticated by: ALBINA SYLVESTER   Date: 12/19/2024  05:33

## 2024-12-18 NOTE — CT_ITS
The [address] [address] 
     [phone] 
  
  
Patient Name: 
JEFFREY ABDI 
  
MRN: [id number]    YOB: 1960    Sex: M 
Assigned Patient Location: LAB 
Current Patient Location: LAB 
Accession/Order Number: [id number] 
Exam Date: 12/18/2024  14:18    Report Date: 12/18/2024  18:00 
  
At the request of: 
LEONARD BUTT   
  
Procedure:  CT abdomen pelvis w con 
  
PROCEDURE: CT abdomen pelvis w con 
  
CLINICAL INDICATION: 64 years Male  
 Diarrhea, Right Lower Quadrant Pain  
  
COMPARISONS: Abdominal CT angiogram dated 3/14/2023. 
  
TECHNIQUE: CT imaging of the abdomen and pelvis was performed following  
intravenous administration of 100 mL of Omnipaque 300. Oral contrast was also  
administered. Coronal and sagittal reformations were created. Individualized  
dose optimization technique was used for the procedure performed.  
  
FINDINGS: Limited visualization of lung bases is unremarkable. There is no  
pleural or pericardial effusion. 
  
Micronodular hepatic contour is suggestive of underlying cirrhotic changes.  
There are small bilateral renal cysts. Gallbladder, spleen, pancreas and  
adrenal glands are normal. 
  
Bowel loops are normal in course and caliber, there is no obstruction or free  
air. Appendix is normal. There are watery colonic contents. There is no  
ascites  
or adenopathy. There is moderate to severe aortoiliac atherosclerotic disease.  
  
A 3 cm infrarenal abdominal aortic aneurysm measured 2.6 cm on the previous  
exam. There are moderate to severe multilevel arthritic changes of the lumbar  
spine. 
  
ORDER #: [id number] CT/CT abdomen pelvis w con  
IMPRESSION:   
1. Watery colonic contents suggestive of pulmonary embolus.  
2. Enlarging size of infrarenal abdominal aortic aneurysm as detailed above.   
Follow-up recommended.  
   
   
Electronically authenticated by: DONY GUAMAN   Date: 12/18/2024  18:00

## 2025-02-17 ENCOUNTER — HOSPITAL ENCOUNTER (OUTPATIENT)
Dept: CARDIOLOGY | Facility: HOSPITAL | Age: 65
Discharge: HOME | End: 2025-02-17
Payer: MEDICARE

## 2025-02-17 DIAGNOSIS — Z95.810 CARDIAC DEFIBRILLATOR IN PLACE: ICD-10-CM

## 2025-02-17 PROCEDURE — 93296 REM INTERROG EVL PM/IDS: CPT

## 2025-02-17 PROCEDURE — 93295 DEV INTERROG REMOTE 1/2/MLT: CPT | Performed by: INTERNAL MEDICINE

## 2025-03-05 ENCOUNTER — APPOINTMENT (OUTPATIENT)
Dept: CARDIOLOGY | Facility: CLINIC | Age: 65
End: 2025-03-05
Payer: MEDICARE

## 2025-03-14 ENCOUNTER — APPOINTMENT (OUTPATIENT)
Dept: CARDIOLOGY | Facility: CLINIC | Age: 65
End: 2025-03-14
Payer: MEDICARE

## 2025-04-02 VITALS
TEMPERATURE: 96.5 F | HEART RATE: 64 BPM | SYSTOLIC BLOOD PRESSURE: 111 MMHG | OXYGEN SATURATION: 99 % | DIASTOLIC BLOOD PRESSURE: 78 MMHG

## 2025-04-02 RX ADMIN — ERTAPENEM 100 GM: 1 INJECTION, POWDER, LYOPHILIZED, FOR SOLUTION INTRAMUSCULAR; INTRAVENOUS at 12:06

## 2025-04-03 VITALS
DIASTOLIC BLOOD PRESSURE: 72 MMHG | OXYGEN SATURATION: 96 % | HEART RATE: 76 BPM | TEMPERATURE: 97.16 F | SYSTOLIC BLOOD PRESSURE: 117 MMHG

## 2025-04-03 RX ADMIN — ERTAPENEM 100 GM: 1 INJECTION, POWDER, LYOPHILIZED, FOR SOLUTION INTRAMUSCULAR; INTRAVENOUS at 14:02

## 2025-04-05 VITALS
OXYGEN SATURATION: 98 % | TEMPERATURE: 97.7 F | HEART RATE: 64 BPM | DIASTOLIC BLOOD PRESSURE: 68 MMHG | SYSTOLIC BLOOD PRESSURE: 108 MMHG

## 2025-04-05 RX ADMIN — ERTAPENEM 100 GM: 1 INJECTION, POWDER, LYOPHILIZED, FOR SOLUTION INTRAMUSCULAR; INTRAVENOUS at 10:40

## 2025-04-06 VITALS
DIASTOLIC BLOOD PRESSURE: 78 MMHG | OXYGEN SATURATION: 95 % | HEART RATE: 73 BPM | TEMPERATURE: 97.88 F | SYSTOLIC BLOOD PRESSURE: 123 MMHG

## 2025-04-06 RX ADMIN — ERTAPENEM 100 GM: 1 INJECTION, POWDER, LYOPHILIZED, FOR SOLUTION INTRAMUSCULAR; INTRAVENOUS at 10:13

## 2025-04-07 VITALS
SYSTOLIC BLOOD PRESSURE: 144 MMHG | OXYGEN SATURATION: 96 % | TEMPERATURE: 96.98 F | DIASTOLIC BLOOD PRESSURE: 81 MMHG | HEART RATE: 70 BPM

## 2025-04-07 RX ADMIN — ERTAPENEM 100 GM: 1 INJECTION, POWDER, LYOPHILIZED, FOR SOLUTION INTRAMUSCULAR; INTRAVENOUS at 12:10

## 2025-04-08 VITALS
SYSTOLIC BLOOD PRESSURE: 113 MMHG | TEMPERATURE: 97 F | HEART RATE: 68 BPM | OXYGEN SATURATION: 97 % | DIASTOLIC BLOOD PRESSURE: 73 MMHG

## 2025-04-08 RX ADMIN — ERTAPENEM 100 GM: 1 INJECTION, POWDER, LYOPHILIZED, FOR SOLUTION INTRAMUSCULAR; INTRAVENOUS at 11:54

## 2025-04-09 VITALS
HEART RATE: 63 BPM | DIASTOLIC BLOOD PRESSURE: 67 MMHG | TEMPERATURE: 97.52 F | OXYGEN SATURATION: 96 % | SYSTOLIC BLOOD PRESSURE: 106 MMHG

## 2025-04-09 RX ADMIN — ERTAPENEM 100 GM: 1 INJECTION, POWDER, LYOPHILIZED, FOR SOLUTION INTRAMUSCULAR; INTRAVENOUS at 11:44

## 2025-04-10 VITALS
OXYGEN SATURATION: 95 % | DIASTOLIC BLOOD PRESSURE: 84 MMHG | HEART RATE: 78 BPM | TEMPERATURE: 97.7 F | SYSTOLIC BLOOD PRESSURE: 139 MMHG

## 2025-04-10 RX ADMIN — ERTAPENEM 100 GM: 1 INJECTION, POWDER, LYOPHILIZED, FOR SOLUTION INTRAMUSCULAR; INTRAVENOUS at 13:25

## 2025-04-11 ENCOUNTER — HOSPITAL ENCOUNTER (OUTPATIENT)
Dept: HOSPITAL 101 - INF | Age: 65
LOS: 19 days | Discharge: HOME | End: 2025-04-30
Payer: MEDICARE

## 2025-04-11 DIAGNOSIS — M27.2: Primary | ICD-10-CM

## 2025-04-11 PROCEDURE — 96365 THER/PROPH/DIAG IV INF INIT: CPT

## 2025-04-11 PROCEDURE — C1887 CATHETER, GUIDING: HCPCS

## 2025-04-11 PROCEDURE — 36410 VNPNXR 3YR/> PHY/QHP DX/THER: CPT

## 2025-04-12 VITALS
OXYGEN SATURATION: 97 % | DIASTOLIC BLOOD PRESSURE: 64 MMHG | TEMPERATURE: 97.52 F | HEART RATE: 66 BPM | SYSTOLIC BLOOD PRESSURE: 104 MMHG

## 2025-04-12 VITALS
OXYGEN SATURATION: 96 % | DIASTOLIC BLOOD PRESSURE: 68 MMHG | HEART RATE: 63 BPM | SYSTOLIC BLOOD PRESSURE: 107 MMHG | TEMPERATURE: 97.7 F

## 2025-04-12 RX ADMIN — ERTAPENEM 100 GM: 1 INJECTION, POWDER, LYOPHILIZED, FOR SOLUTION INTRAMUSCULAR; INTRAVENOUS at 10:12

## 2025-04-16 ENCOUNTER — HOSPITAL ENCOUNTER (OUTPATIENT)
Dept: HOSPITAL 101 - INF | Age: 65
Discharge: HOME | End: 2025-04-16
Payer: MEDICARE

## 2025-04-16 ENCOUNTER — HOSPITAL ENCOUNTER
Dept: HOSPITAL 101 - LAB | Age: 65
Discharge: HOME | End: 2025-04-16
Payer: MEDICARE

## 2025-04-16 DIAGNOSIS — K04.7: Primary | ICD-10-CM

## 2025-04-16 DIAGNOSIS — K04.7: ICD-10-CM

## 2025-04-16 DIAGNOSIS — R41.3: Primary | ICD-10-CM

## 2025-04-16 LAB
ESTIMATED GFR (AFRICAN AMERICA: >60
ESTIMATED GFR (NON-AFRICAN AME: >60

## 2025-04-16 PROCEDURE — 70470 CT HEAD/BRAIN W/O & W/DYE: CPT

## 2025-04-16 PROCEDURE — 82565 ASSAY OF CREATININE: CPT

## 2025-04-16 PROCEDURE — 36415 COLL VENOUS BLD VENIPUNCTURE: CPT

## 2025-06-03 ENCOUNTER — APPOINTMENT (OUTPATIENT)
Dept: CARDIOLOGY | Facility: CLINIC | Age: 65
End: 2025-06-03
Payer: MEDICARE

## 2025-06-04 ENCOUNTER — APPOINTMENT (OUTPATIENT)
Dept: CARDIOLOGY | Facility: CLINIC | Age: 65
End: 2025-06-04
Payer: MEDICARE

## 2025-06-04 ENCOUNTER — APPOINTMENT (OUTPATIENT)
Dept: CARDIOLOGY | Facility: HOSPITAL | Age: 65
End: 2025-06-04
Payer: MEDICARE

## 2025-06-10 ENCOUNTER — HOSPITAL ENCOUNTER (OUTPATIENT)
Dept: CARDIOLOGY | Facility: HOSPITAL | Age: 65
Discharge: HOME | End: 2025-06-10
Payer: MEDICARE

## 2025-06-10 DIAGNOSIS — Z95.810 ICD (IMPLANTABLE CARDIOVERTER-DEFIBRILLATOR) IN PLACE: ICD-10-CM

## 2025-06-10 DIAGNOSIS — I42.0 PRIMARY IDIOPATHIC DILATED CARDIOMYOPATHY (MULTI): ICD-10-CM

## 2025-06-10 PROCEDURE — 93295 DEV INTERROG REMOTE 1/2/MLT: CPT | Performed by: INTERNAL MEDICINE

## 2025-06-10 PROCEDURE — 93296 REM INTERROG EVL PM/IDS: CPT

## 2025-07-07 DIAGNOSIS — I10 HYPERTENSION, UNSPECIFIED TYPE: ICD-10-CM

## 2025-07-07 RX ORDER — CARVEDILOL 6.25 MG/1
3.12 TABLET ORAL
Qty: 90 TABLET | Refills: 0 | Status: SHIPPED | OUTPATIENT
Start: 2025-07-07

## 2025-07-07 NOTE — TELEPHONE ENCOUNTER
Received request for prescription refills for patient.   Patient follows with Dr. Feldman    Request is for carvedilol  Is patient currently on medication yes    Last OV 11/13/2024  Next OV 9/3/2025-- patient missed last appointment. 90 day supply pended until upcoming appointment. Per spouse patient will be out of medication soon.    Pended for signing and sent to provider

## 2025-07-08 LAB
NON-UH HIE AGAP: 8 MEQ/L (ref 6–16)
NON-UH HIE BASOPHIL ABSOLUTE: 0.1 E9/L (ref 0–0.2)
NON-UH HIE BASOPHIL AUTO: 1.2 % (ref 0–2)
NON-UH HIE BUN/CREAT RATIO: 10 NO UNITS (ref 10–20)
NON-UH HIE BUN: 8 MG/DL (ref 5–21)
NON-UH HIE CALCIUM LVL: 9.1 MG/DL (ref 8.9–11.1)
NON-UH HIE CHLORIDE: 106 MMOL/L (ref 101–111)
NON-UH HIE CO2: 26 MMOL/L (ref 21–31)
NON-UH HIE CREATININE: 0.8 MG/DL (ref 0.5–1.3)
NON-UH HIE EGFR: 99 ML/MIN/1.73 M2
NON-UH HIE EOS ABSOLUTE: 0.2 E9/L (ref 0–0.5)
NON-UH HIE EOS AUTO: 3 % (ref 0–8)
NON-UH HIE GLUCOSE LVL: 91 MG/DL (ref 55–199)
NON-UH HIE HCT: 32.4 % (ref 37.7–49)
NON-UH HIE HGB: 11.6 GM/DL (ref 13.5–17.5)
NON-UH HIE INR: 1.01
NON-UH HIE LIPASE LVL: 27 UNIT/L (ref 13–58)
NON-UH HIE LYMPH ABSOLUTE: 2 E9/L (ref 1–4)
NON-UH HIE LYMPH AUTO: 30.1 % (ref 14–50)
NON-UH HIE MCH: 34.2 PG (ref 27–34)
NON-UH HIE MCHC: 35.7 GM/DL (ref 31.4–36)
NON-UH HIE MCV: 95.9 FL (ref 80–100)
NON-UH HIE MONO ABSOLUTE: 0.8 E9/L (ref 0.2–1)
NON-UH HIE MONO AUTO: 12.1 % (ref 4–14)
NON-UH HIE MPV: 7.2 FL (ref 6.4–10.8)
NON-UH HIE NEUTRO ABSOLUTE: 3.5 E9/L (ref 2–7.5)
NON-UH HIE NEUTRO AUTO: 53.6 % (ref 36–75)
NON-UH HIE PLATELET: 249 E9/L (ref 150–500)
NON-UH HIE POTASSIUM LVL: 3.7 MMOL/L (ref 3.5–5.3)
NON-UH HIE PT: 11.3 SECOND(S) (ref 9.4–12.5)
NON-UH HIE PTT: 28.6 SECOND(S) (ref 25.1–36.5)
NON-UH HIE RBC: 3.4 E12/L (ref 4.3–5.9)
NON-UH HIE RDW: 13.3 % (ref 10.9–14.2)
NON-UH HIE SODIUM LVL: 136 MMOL/L (ref 135–145)
NON-UH HIE WBC: 6.6 E9/L (ref 4–11)

## 2025-07-09 LAB
NON-UH HIE BNP: 198 PG/ML (ref 5–80)
NON-UH HIE TROPONIN: 8.7 PG/ML (ref 15.9–38.4)
NON-UH HIE TROPONIN: 9.3 PG/ML (ref 15.9–38.4)

## 2025-07-11 ENCOUNTER — TELEPHONE (OUTPATIENT)
Dept: CARDIOLOGY | Facility: CLINIC | Age: 65
End: 2025-07-11
Payer: MEDICARE

## 2025-07-17 ENCOUNTER — APPOINTMENT (OUTPATIENT)
Dept: CARDIOLOGY | Facility: CLINIC | Age: 65
End: 2025-07-17
Payer: MEDICARE

## 2025-08-21 ENCOUNTER — APPOINTMENT (OUTPATIENT)
Dept: CARDIOLOGY | Facility: CLINIC | Age: 65
End: 2025-08-21
Payer: MEDICARE

## 2025-08-21 VITALS
HEART RATE: 64 BPM | DIASTOLIC BLOOD PRESSURE: 60 MMHG | WEIGHT: 142.8 LBS | HEIGHT: 70 IN | SYSTOLIC BLOOD PRESSURE: 102 MMHG | BODY MASS INDEX: 20.44 KG/M2

## 2025-08-21 DIAGNOSIS — I50.9 CONGESTIVE HEART FAILURE, NYHA CLASS 2, UNSPECIFIED CONGESTIVE HEART FAILURE TYPE: ICD-10-CM

## 2025-08-21 DIAGNOSIS — F17.200 CURRENT EVERY DAY SMOKER: ICD-10-CM

## 2025-08-21 DIAGNOSIS — I25.5 ISCHEMIC CARDIOMYOPATHY: ICD-10-CM

## 2025-08-21 DIAGNOSIS — I25.2 HISTORY OF ANTERIOR WALL MYOCARDIAL INFARCTION: ICD-10-CM

## 2025-08-21 DIAGNOSIS — Z98.61 S/P PTCA (PERCUTANEOUS TRANSLUMINAL CORONARY ANGIOPLASTY): ICD-10-CM

## 2025-08-21 DIAGNOSIS — Z95.810 CARDIAC DEFIBRILLATOR IN PLACE: ICD-10-CM

## 2025-08-21 ASSESSMENT — ENCOUNTER SYMPTOMS: DIZZINESS: 1

## 2025-09-03 ENCOUNTER — APPOINTMENT (OUTPATIENT)
Dept: CARDIOLOGY | Facility: CLINIC | Age: 65
End: 2025-09-03
Payer: MEDICARE

## 2025-09-03 ENCOUNTER — HOSPITAL ENCOUNTER (OUTPATIENT)
Dept: CARDIOLOGY | Facility: HOSPITAL | Age: 65
Discharge: HOME | End: 2025-09-03
Payer: MEDICARE

## 2025-09-03 DIAGNOSIS — Z95.810 CARDIAC DEFIBRILLATOR IN PLACE: ICD-10-CM

## 2025-09-03 PROCEDURE — 93283 PRGRMG EVAL IMPLANTABLE DFB: CPT

## 2025-09-03 PROCEDURE — 93283 PRGRMG EVAL IMPLANTABLE DFB: CPT | Performed by: INTERNAL MEDICINE

## 2025-09-03 ASSESSMENT — ENCOUNTER SYMPTOMS
PALPITATIONS: 0
DYSPNEA ON EXERTION: 0

## 2025-12-22 ENCOUNTER — APPOINTMENT (OUTPATIENT)
Dept: CARDIOLOGY | Facility: CLINIC | Age: 65
End: 2025-12-22
Payer: MEDICARE

## 2026-09-02 ENCOUNTER — APPOINTMENT (OUTPATIENT)
Dept: CARDIOLOGY | Facility: CLINIC | Age: 66
End: 2026-09-02
Payer: MEDICARE

## 2026-09-03 ENCOUNTER — APPOINTMENT (OUTPATIENT)
Dept: CARDIOLOGY | Facility: HOSPITAL | Age: 66
End: 2026-09-03
Payer: MEDICARE